# Patient Record
Sex: FEMALE | Race: WHITE | NOT HISPANIC OR LATINO
[De-identification: names, ages, dates, MRNs, and addresses within clinical notes are randomized per-mention and may not be internally consistent; named-entity substitution may affect disease eponyms.]

---

## 2017-08-09 ENCOUNTER — TRANSCRIPTION ENCOUNTER (OUTPATIENT)
Age: 62
End: 2017-08-09

## 2018-12-02 ENCOUNTER — TRANSCRIPTION ENCOUNTER (OUTPATIENT)
Age: 63
End: 2018-12-02

## 2018-12-15 ENCOUNTER — TRANSCRIPTION ENCOUNTER (OUTPATIENT)
Age: 63
End: 2018-12-15

## 2019-04-12 ENCOUNTER — TRANSCRIPTION ENCOUNTER (OUTPATIENT)
Age: 64
End: 2019-04-12

## 2021-05-30 ENCOUNTER — TRANSCRIPTION ENCOUNTER (OUTPATIENT)
Age: 66
End: 2021-05-30

## 2022-01-14 PROBLEM — Z00.00 ENCOUNTER FOR PREVENTIVE HEALTH EXAMINATION: Status: ACTIVE | Noted: 2022-01-14

## 2022-02-01 ENCOUNTER — APPOINTMENT (OUTPATIENT)
Dept: PLASTIC SURGERY | Facility: CLINIC | Age: 67
End: 2022-02-01
Payer: COMMERCIAL

## 2022-02-01 VITALS
OXYGEN SATURATION: 100 % | RESPIRATION RATE: 20 BRPM | HEIGHT: 68 IN | WEIGHT: 136 LBS | HEART RATE: 63 BPM | TEMPERATURE: 98.4 F | BODY MASS INDEX: 20.61 KG/M2 | SYSTOLIC BLOOD PRESSURE: 138 MMHG | DIASTOLIC BLOOD PRESSURE: 84 MMHG

## 2022-02-01 DIAGNOSIS — D59.5: ICD-10-CM

## 2022-02-01 DIAGNOSIS — L81.9 DISORDER OF PIGMENTATION, UNSPECIFIED: ICD-10-CM

## 2022-02-01 DIAGNOSIS — Z78.9 OTHER SPECIFIED HEALTH STATUS: ICD-10-CM

## 2022-02-01 DIAGNOSIS — Z86.39 PERSONAL HISTORY OF OTHER ENDOCRINE, NUTRITIONAL AND METABOLIC DISEASE: ICD-10-CM

## 2022-02-01 PROCEDURE — 99203 OFFICE O/P NEW LOW 30 MIN: CPT

## 2022-02-01 RX ORDER — LEVOTHYROXINE SODIUM 0.07 MG/1
75 TABLET ORAL
Refills: 0 | Status: ACTIVE | COMMUNITY

## 2022-02-01 RX ORDER — ZOLPIDEM TARTRATE 10 MG/1
10 TABLET, FILM COATED ORAL
Refills: 0 | Status: ACTIVE | COMMUNITY

## 2022-02-01 NOTE — PHYSICAL EXAM
[NI] : Normal [de-identified] : nasal lesion 1 x 1 cm vascular formation with skin involvement no underlying induration and blanches completely with digital pressure  [de-identified] : upper eyelids with small amount of redundant skin 4-6 mm  minimal fat herniations  iban eomi nl levator function

## 2022-02-01 NOTE — ASSESSMENT
[FreeTextEntry1] : pt with what appears to be a vascular lesion of the nose and will treat with vascular wavelength max g .  The risks, benefits, alternatives, limitations and the permanent scars were outlined with the patient. limitations stressed and will consider removing it surgically if non reactive to ipl .\par  Pt is a candidate for upper eyelid secondary blepharoplasty. The risks, benefits, alternatives, limitations and the permanent scars were outlined with the patient.

## 2022-02-01 NOTE — HISTORY OF PRESENT ILLNESS
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 She is concerned it may be a basal cell again .  no bleeding ulceration or induration .  lesion is 1 cm and has telangiectatic appearance without induration  and without mass.  it blanches with digital pressure and immediately refills  suggesting that this is a vascular lesion and not a malignancy The risks, benefits, alternatives, limitations and the permanent scars were outlined with the patient.  recommending 5 sessions of max g targeted wavelength treatments and biopsy if not improved after that .

## 2022-05-12 RX ORDER — DIAZEPAM 5 MG/1
5 TABLET ORAL
Qty: 2 | Refills: 0 | Status: ACTIVE | COMMUNITY
Start: 2022-05-12 | End: 1900-01-01

## 2022-05-12 RX ORDER — OXYCODONE AND ACETAMINOPHEN 5; 325 MG/1; MG/1
5-325 TABLET ORAL
Qty: 12 | Refills: 0 | Status: ACTIVE | COMMUNITY
Start: 2022-05-12 | End: 1900-01-01

## 2022-05-12 RX ORDER — DOXYCYCLINE HYCLATE 100 MG/1
100 CAPSULE ORAL
Qty: 10 | Refills: 0 | Status: ACTIVE | COMMUNITY
Start: 2022-05-12 | End: 1900-01-01

## 2022-06-17 ENCOUNTER — APPOINTMENT (OUTPATIENT)
Dept: PLASTIC SURGERY | Facility: CLINIC | Age: 67
End: 2022-06-17
Payer: SELF-PAY

## 2022-06-17 VITALS — DIASTOLIC BLOOD PRESSURE: 69 MMHG | HEART RATE: 52 BPM | SYSTOLIC BLOOD PRESSURE: 112 MMHG | OXYGEN SATURATION: 98 %

## 2022-06-17 VITALS — HEART RATE: 51 BPM | OXYGEN SATURATION: 98 % | DIASTOLIC BLOOD PRESSURE: 66 MMHG | SYSTOLIC BLOOD PRESSURE: 118 MMHG

## 2022-06-17 VITALS — SYSTOLIC BLOOD PRESSURE: 102 MMHG | DIASTOLIC BLOOD PRESSURE: 69 MMHG | HEART RATE: 58 BPM | OXYGEN SATURATION: 98 %

## 2022-06-17 PROCEDURE — 15822 BLEPHAROPLASTY UPPER EYELID: CPT | Mod: 50

## 2022-06-17 PROCEDURE — 99072 ADDL SUPL MATRL&STAF TM PHE: CPT

## 2022-06-17 RX ORDER — SODIUM SULFATE, MAGNESIUM SULFATE, AND POTASSIUM CHLORIDE 17.75; 2.7; 2.25 G/1; G/1; G/1
1479-225-188 TABLET ORAL
Qty: 24 | Refills: 0 | Status: ACTIVE | COMMUNITY
Start: 2022-02-01

## 2022-06-17 RX ORDER — VALACYCLOVIR 1 G/1
1 TABLET, FILM COATED ORAL
Qty: 8 | Refills: 0 | Status: ACTIVE | COMMUNITY
Start: 2022-03-11

## 2022-06-17 RX ORDER — AZITHROMYCIN 250 MG/1
250 TABLET, FILM COATED ORAL
Qty: 6 | Refills: 0 | Status: ACTIVE | COMMUNITY
Start: 2022-05-13

## 2022-06-17 RX ORDER — LEVOTHYROXINE SODIUM 0.09 MG/1
88 TABLET ORAL
Qty: 90 | Refills: 0 | Status: ACTIVE | COMMUNITY
Start: 2022-06-15

## 2022-06-17 NOTE — PROCEDURE
[Nl] : None [FreeTextEntry1] : recurrent blepharochalasis upper eyelids  [FreeTextEntry2] : secondary upper blepharoplasty  [FreeTextEntry6] :  GREG KHAN  is complaining of upper eyelid blepharochalasis.   The risks, benefits, alternatives limitations and the permanent scars were outlined with the patient and  she she is prepared for an upper blepharoplasty and has been compliant with the pre-operative instructions.  She has upper bleph surgery 30 years ago and extra care was taken to provide enough skin for competence of eyelid closure \par \par Procedure:  Under aseptic conditions with local anesthetic and oral sedation,  the upper eyelid incision lines were delineated with marks on the supratarsal crease and at the upper limit of redundant eyelid skin  accounting for brow position.  Local anesthetic was infiltrated into the subcutaneous plane  and after ten minutes elapsed time the incision was made and the skin ellipse was excised.  A strip of pre-septal orbicularis oculi was resected and the orbital septum was entered.   Conservative resection of the residual medial fat pads was accomplished and the stumps of the fat pads were cauterized.  once hemostasis was assured with electrocautery, the wounds were reapproximated with running 6-0 nylon sutures\par \par

## 2022-06-17 NOTE — ASSESSMENT
[FreeTextEntry1] : GREG tolerated the procedure well. GREG  has had uncomplicated recovery from surgery and anesthesia The instructions were reviewed in detail with GREG. All of GREG 's questions and concerns were addressed and answered completely GREG will return to the office for a post procedure visit tuesday next week

## 2022-06-21 ENCOUNTER — APPOINTMENT (OUTPATIENT)
Dept: PLASTIC SURGERY | Facility: CLINIC | Age: 67
End: 2022-06-21
Payer: SELF-PAY

## 2022-06-21 PROCEDURE — 99024 POSTOP FOLLOW-UP VISIT: CPT

## 2022-06-21 NOTE — HISTORY OF PRESENT ILLNESS
[FreeTextEntry1] : GREG is s/p secondary upper bleph  in the office GREG  has had uncomplicated recovery from surgery and anesthesia All of GREG's incisions are clean dry and healing well.  Her  sutures were removed and areas steri stripped. she has normal closure and competence of the eyelids and is happy with her appearance

## 2022-06-21 NOTE — ASSESSMENT
[FreeTextEntry1] : GREG tolerated the procedure well. The instructions were reviewed in detail with GREG. All of GREG 's questions and concerns were addressed and answered completely GREG will return to the office for a post procedure visit 3 months  continue lubrication drops x 3 months

## 2022-08-02 ENCOUNTER — APPOINTMENT (OUTPATIENT)
Dept: PLASTIC SURGERY | Facility: CLINIC | Age: 67
End: 2022-08-02

## 2022-08-02 DIAGNOSIS — H02.34 BLEPHAROCHALASIS RIGHT UPPER EYELID: ICD-10-CM

## 2022-08-02 DIAGNOSIS — H02.31 BLEPHAROCHALASIS RIGHT UPPER EYELID: ICD-10-CM

## 2022-08-02 PROCEDURE — 99024 POSTOP FOLLOW-UP VISIT: CPT

## 2022-08-02 NOTE — HISTORY OF PRESENT ILLNESS
[FreeTextEntry1] : GREG is 6 weeks after blepharoplasty and feels great  she is very happy with the results of her surgery and denies dry eyes or any other issues

## 2022-08-02 NOTE — ASSESSMENT
[FreeTextEntry1] : GREG  has had uncomplicated recovery from surgery and anesthesia The instructions were reviewed in detail with GREG. GREG will return to the office for a post procedure visit as needed

## 2023-08-07 ENCOUNTER — OFFICE (OUTPATIENT)
Dept: URBAN - METROPOLITAN AREA CLINIC 29 | Facility: CLINIC | Age: 68
Setting detail: OPHTHALMOLOGY
End: 2023-08-07
Payer: MEDICARE

## 2023-08-07 DIAGNOSIS — H02.89: ICD-10-CM

## 2023-08-07 DIAGNOSIS — H01.001: ICD-10-CM

## 2023-08-07 DIAGNOSIS — H16.223: ICD-10-CM

## 2023-08-07 DIAGNOSIS — H01.004: ICD-10-CM

## 2023-08-07 PROCEDURE — 92014 COMPRE OPH EXAM EST PT 1/>: CPT | Performed by: OPTOMETRIST

## 2023-08-07 ASSESSMENT — REFRACTION_MANIFEST
OS_SPHERE: +0.25
OS_VA1: 20/20
OD_ADD: +1.50
OD_SPHERE: +0.25
OD_CYLINDER: SPHERE
OS_ADD: +1.50
OS_CYLINDER: SPHERE
OD_VA1: 20/20

## 2023-08-07 ASSESSMENT — REFRACTION_CURRENTRX
OD_VPRISM_DIRECTION: SV
OS_OVR_VA: 20/
OD_SPHERE: +1.50
OS_SPHERE: +1.50
OS_CYLINDER: SPHERE
OS_VPRISM_DIRECTION: SV
OD_CYLINDER: SPHERE
OD_OVR_VA: 20/

## 2023-08-07 ASSESSMENT — REFRACTION_AUTOREFRACTION
OS_SPHERE: +0.25
OD_CYLINDER: +0.25
OD_AXIS: 80
OS_CYLINDER: +0.50
OS_AXIS: 25
OD_SPHERE: +0.50

## 2023-08-07 ASSESSMENT — VISUAL ACUITY
OD_BCVA: 20/20-1
OS_BCVA: 20/20

## 2023-08-07 ASSESSMENT — SUPERFICIAL PUNCTATE KERATITIS (SPK)
OD_SPK: 2+
OS_SPK: 1+

## 2023-08-07 ASSESSMENT — LID EXAM ASSESSMENTS
OD_COMMENTS: BLEPHARITIS CHANGES OF THE EYELID MARGINS
OS_COMMENTS: BLEPHARITIS CHANGES OF THE EYELID MARGINS

## 2023-08-07 ASSESSMENT — CONFRONTATIONAL VISUAL FIELD TEST (CVF)
OS_FINDINGS: FULL
OD_FINDINGS: FULL

## 2023-08-07 ASSESSMENT — TONOMETRY
OS_IOP_MMHG: 16
OD_IOP_MMHG: 16

## 2023-08-07 ASSESSMENT — SPHEQUIV_DERIVED
OS_SPHEQUIV: 0.5
OD_SPHEQUIV: 0.625

## 2023-08-07 ASSESSMENT — DECREASING TEAR LAKE - SEVERITY SCORE
OS_DEC_TEARLAKE: 1+
OD_DEC_TEARLAKE: 1+

## 2025-04-30 ENCOUNTER — NON-APPOINTMENT (OUTPATIENT)
Age: 70
End: 2025-04-30

## 2025-05-01 ENCOUNTER — NON-APPOINTMENT (OUTPATIENT)
Age: 70
End: 2025-05-01